# Patient Record
Sex: FEMALE | Race: WHITE | NOT HISPANIC OR LATINO | Employment: OTHER | URBAN - METROPOLITAN AREA
[De-identification: names, ages, dates, MRNs, and addresses within clinical notes are randomized per-mention and may not be internally consistent; named-entity substitution may affect disease eponyms.]

---

## 2021-09-25 ENCOUNTER — APPOINTMENT (OUTPATIENT)
Dept: GENERAL RADIOLOGY | Facility: CLINIC | Age: 66
End: 2021-09-25
Attending: EMERGENCY MEDICINE
Payer: COMMERCIAL

## 2021-09-25 ENCOUNTER — HOSPITAL ENCOUNTER (OUTPATIENT)
Facility: CLINIC | Age: 66
Discharge: HOME | End: 2021-09-26
Attending: EMERGENCY MEDICINE | Admitting: ORTHOPAEDIC SURGERY
Payer: COMMERCIAL

## 2021-09-25 ENCOUNTER — ANESTHESIA EVENT (OUTPATIENT)
Dept: SURGERY | Facility: CLINIC | Age: 66
End: 2021-09-25
Payer: COMMERCIAL

## 2021-09-25 ENCOUNTER — APPOINTMENT (OUTPATIENT)
Dept: GENERAL RADIOLOGY | Facility: CLINIC | Age: 66
End: 2021-09-25
Attending: ORTHOPAEDIC SURGERY
Payer: COMMERCIAL

## 2021-09-25 ENCOUNTER — ANESTHESIA (OUTPATIENT)
Dept: SURGERY | Facility: CLINIC | Age: 66
End: 2021-09-25
Payer: COMMERCIAL

## 2021-09-25 DIAGNOSIS — S53.104A DISLOCATION OF RIGHT ELBOW, INITIAL ENCOUNTER: ICD-10-CM

## 2021-09-25 DIAGNOSIS — S63.004A DISLOCATION OF CARPAL JOINT OF RIGHT WRIST, INITIAL ENCOUNTER: ICD-10-CM

## 2021-09-25 LAB — SARS-COV-2 RNA RESP QL NAA+PROBE: NEGATIVE

## 2021-09-25 PROCEDURE — 258N000003 HC RX IP 258 OP 636: Performed by: NURSE ANESTHETIST, CERTIFIED REGISTERED

## 2021-09-25 PROCEDURE — 710N000012 HC RECOVERY PHASE 2, PER MINUTE: Performed by: ORTHOPAEDIC SURGERY

## 2021-09-25 PROCEDURE — 73080 X-RAY EXAM OF ELBOW: CPT | Mod: RT

## 2021-09-25 PROCEDURE — 250N000011 HC RX IP 250 OP 636: Performed by: NURSE ANESTHETIST, CERTIFIED REGISTERED

## 2021-09-25 PROCEDURE — 96374 THER/PROPH/DIAG INJ IV PUSH: CPT | Mod: 59

## 2021-09-25 PROCEDURE — 258N000003 HC RX IP 258 OP 636: Performed by: ANESTHESIOLOGY

## 2021-09-25 PROCEDURE — 999N000065 XR WRIST PORTABLE RIGHT 2 VIEWS: Mod: TC,RT

## 2021-09-25 PROCEDURE — 250N000009 HC RX 250: Performed by: ANESTHESIOLOGY

## 2021-09-25 PROCEDURE — 99285 EMERGENCY DEPT VISIT HI MDM: CPT | Mod: 25

## 2021-09-25 PROCEDURE — 250N000011 HC RX IP 250 OP 636: Performed by: EMERGENCY MEDICINE

## 2021-09-25 PROCEDURE — 73090 X-RAY EXAM OF FOREARM: CPT | Mod: RT

## 2021-09-25 PROCEDURE — 360N000082 HC SURGERY LEVEL 2 W/ FLUORO, PER MIN: Performed by: ORTHOPAEDIC SURGERY

## 2021-09-25 PROCEDURE — U0003 INFECTIOUS AGENT DETECTION BY NUCLEIC ACID (DNA OR RNA); SEVERE ACUTE RESPIRATORY SYNDROME CORONAVIRUS 2 (SARS-COV-2) (CORONAVIRUS DISEASE [COVID-19]), AMPLIFIED PROBE TECHNIQUE, MAKING USE OF HIGH THROUGHPUT TECHNOLOGIES AS DESCRIBED BY CMS-2020-01-R: HCPCS | Performed by: EMERGENCY MEDICINE

## 2021-09-25 PROCEDURE — 250N000011 HC RX IP 250 OP 636: Performed by: ORTHOPAEDIC SURGERY

## 2021-09-25 PROCEDURE — 250N000011 HC RX IP 250 OP 636: Performed by: ANESTHESIOLOGY

## 2021-09-25 PROCEDURE — 73060 X-RAY EXAM OF HUMERUS: CPT | Mod: RT

## 2021-09-25 PROCEDURE — 272N000001 HC OR GENERAL SUPPLY STERILE: Performed by: ORTHOPAEDIC SURGERY

## 2021-09-25 PROCEDURE — 250N000009 HC RX 250: Performed by: NURSE ANESTHETIST, CERTIFIED REGISTERED

## 2021-09-25 PROCEDURE — 370N000017 HC ANESTHESIA TECHNICAL FEE, PER MIN: Performed by: ORTHOPAEDIC SURGERY

## 2021-09-25 PROCEDURE — C9803 HOPD COVID-19 SPEC COLLECT: HCPCS

## 2021-09-25 PROCEDURE — 271N000001 HC OR GENERAL SUPPLY NON-STERILE: Performed by: ORTHOPAEDIC SURGERY

## 2021-09-25 PROCEDURE — 999N000179 XR SURGERY CARM FLUORO LESS THAN 5 MIN W STILLS: Mod: TC

## 2021-09-25 PROCEDURE — 96376 TX/PRO/DX INJ SAME DRUG ADON: CPT

## 2021-09-25 PROCEDURE — 999N000141 HC STATISTIC PRE-PROCEDURE NURSING ASSESSMENT: Performed by: ORTHOPAEDIC SURGERY

## 2021-09-25 DEVICE — IMP WIRE KIRSCHNER 0.045X4" 78.2020: Type: IMPLANTABLE DEVICE | Site: WRIST | Status: FUNCTIONAL

## 2021-09-25 RX ORDER — ONDANSETRON 4 MG/1
4-8 TABLET, ORALLY DISINTEGRATING ORAL EVERY 8 HOURS PRN
Qty: 10 TABLET | Refills: 0 | Status: SHIPPED | OUTPATIENT
Start: 2021-09-25

## 2021-09-25 RX ORDER — HYDROMORPHONE HYDROCHLORIDE 1 MG/ML
0.5 INJECTION, SOLUTION INTRAMUSCULAR; INTRAVENOUS; SUBCUTANEOUS
Status: COMPLETED | OUTPATIENT
Start: 2021-09-25 | End: 2021-09-25

## 2021-09-25 RX ORDER — DEXAMETHASONE SODIUM PHOSPHATE 4 MG/ML
INJECTION, SOLUTION INTRA-ARTICULAR; INTRALESIONAL; INTRAMUSCULAR; INTRAVENOUS; SOFT TISSUE PRN
Status: DISCONTINUED | OUTPATIENT
Start: 2021-09-25 | End: 2021-09-25

## 2021-09-25 RX ORDER — HYDROXYZINE HYDROCHLORIDE 10 MG/1
10 TABLET, FILM COATED ORAL EVERY 6 HOURS PRN
Qty: 30 TABLET | Refills: 0 | Status: SHIPPED | OUTPATIENT
Start: 2021-09-25

## 2021-09-25 RX ORDER — SODIUM CHLORIDE, SODIUM LACTATE, POTASSIUM CHLORIDE, CALCIUM CHLORIDE 600; 310; 30; 20 MG/100ML; MG/100ML; MG/100ML; MG/100ML
INJECTION, SOLUTION INTRAVENOUS CONTINUOUS
Status: DISCONTINUED | OUTPATIENT
Start: 2021-09-25 | End: 2021-09-25 | Stop reason: HOSPADM

## 2021-09-25 RX ORDER — FENTANYL CITRATE 50 UG/ML
INJECTION, SOLUTION INTRAMUSCULAR; INTRAVENOUS PRN
Status: DISCONTINUED | OUTPATIENT
Start: 2021-09-25 | End: 2021-09-25

## 2021-09-25 RX ORDER — NALOXONE HYDROCHLORIDE 0.4 MG/ML
0.4 INJECTION, SOLUTION INTRAMUSCULAR; INTRAVENOUS; SUBCUTANEOUS
Status: DISCONTINUED | OUTPATIENT
Start: 2021-09-25 | End: 2021-09-26 | Stop reason: HOSPADM

## 2021-09-25 RX ORDER — SODIUM CHLORIDE, SODIUM LACTATE, POTASSIUM CHLORIDE, CALCIUM CHLORIDE 600; 310; 30; 20 MG/100ML; MG/100ML; MG/100ML; MG/100ML
INJECTION, SOLUTION INTRAVENOUS CONTINUOUS PRN
Status: DISCONTINUED | OUTPATIENT
Start: 2021-09-25 | End: 2021-09-25

## 2021-09-25 RX ORDER — CLINDAMYCIN PHOSPHATE 900 MG/50ML
900 INJECTION, SOLUTION INTRAVENOUS
Status: DISCONTINUED | OUTPATIENT
Start: 2021-09-25 | End: 2021-09-25 | Stop reason: HOSPADM

## 2021-09-25 RX ORDER — LIDOCAINE 40 MG/G
CREAM TOPICAL
Status: DISCONTINUED | OUTPATIENT
Start: 2021-09-25 | End: 2021-09-25 | Stop reason: HOSPADM

## 2021-09-25 RX ORDER — HYDROCODONE BITARTRATE AND ACETAMINOPHEN 5; 325 MG/1; MG/1
1-2 TABLET ORAL EVERY 6 HOURS PRN
Status: DISCONTINUED | OUTPATIENT
Start: 2021-09-25 | End: 2021-09-26 | Stop reason: HOSPADM

## 2021-09-25 RX ORDER — HYDROMORPHONE HCL IN WATER/PF 6 MG/30 ML
0.2 PATIENT CONTROLLED ANALGESIA SYRINGE INTRAVENOUS EVERY 5 MIN PRN
Status: DISCONTINUED | OUTPATIENT
Start: 2021-09-25 | End: 2021-09-25 | Stop reason: HOSPADM

## 2021-09-25 RX ORDER — BUPIVACAINE HYDROCHLORIDE 2.5 MG/ML
INJECTION, SOLUTION INFILTRATION; PERINEURAL PRN
Status: DISCONTINUED | OUTPATIENT
Start: 2021-09-25 | End: 2021-09-25 | Stop reason: HOSPADM

## 2021-09-25 RX ORDER — ONDANSETRON 2 MG/ML
INJECTION INTRAMUSCULAR; INTRAVENOUS PRN
Status: DISCONTINUED | OUTPATIENT
Start: 2021-09-25 | End: 2021-09-25

## 2021-09-25 RX ORDER — PROPOFOL 10 MG/ML
1 INJECTION, EMULSION INTRAVENOUS ONCE
Status: COMPLETED | OUTPATIENT
Start: 2021-09-25 | End: 2021-09-25

## 2021-09-25 RX ORDER — METOPROLOL TARTRATE 25 MG/1
50 TABLET, FILM COATED ORAL ONCE
Status: DISCONTINUED | OUTPATIENT
Start: 2021-09-25 | End: 2021-09-25

## 2021-09-25 RX ORDER — NALOXONE HYDROCHLORIDE 0.4 MG/ML
0.2 INJECTION, SOLUTION INTRAMUSCULAR; INTRAVENOUS; SUBCUTANEOUS
Status: DISCONTINUED | OUTPATIENT
Start: 2021-09-25 | End: 2021-09-26 | Stop reason: HOSPADM

## 2021-09-25 RX ORDER — LABETALOL HYDROCHLORIDE 5 MG/ML
10 INJECTION, SOLUTION INTRAVENOUS
Status: COMPLETED | OUTPATIENT
Start: 2021-09-25 | End: 2021-09-25

## 2021-09-25 RX ORDER — ONDANSETRON 2 MG/ML
4 INJECTION INTRAMUSCULAR; INTRAVENOUS EVERY 30 MIN PRN
Status: DISCONTINUED | OUTPATIENT
Start: 2021-09-25 | End: 2021-09-25 | Stop reason: HOSPADM

## 2021-09-25 RX ORDER — ACETAMINOPHEN 325 MG/1
650 TABLET ORAL EVERY 4 HOURS PRN
Qty: 100 TABLET | Refills: 0 | Status: SHIPPED | OUTPATIENT
Start: 2021-09-25

## 2021-09-25 RX ORDER — FENTANYL CITRATE 50 UG/ML
50 INJECTION, SOLUTION INTRAMUSCULAR; INTRAVENOUS EVERY 5 MIN PRN
Status: DISCONTINUED | OUTPATIENT
Start: 2021-09-25 | End: 2021-09-25 | Stop reason: HOSPADM

## 2021-09-25 RX ORDER — KETAMINE HYDROCHLORIDE 10 MG/ML
INJECTION INTRAMUSCULAR; INTRAVENOUS PRN
Status: DISCONTINUED | OUTPATIENT
Start: 2021-09-25 | End: 2021-09-25

## 2021-09-25 RX ORDER — PROPOFOL 10 MG/ML
INJECTION, EMULSION INTRAVENOUS
Status: DISCONTINUED
Start: 2021-09-25 | End: 2021-09-25 | Stop reason: HOSPADM

## 2021-09-25 RX ORDER — PROPOFOL 10 MG/ML
INJECTION, EMULSION INTRAVENOUS CONTINUOUS PRN
Status: DISCONTINUED | OUTPATIENT
Start: 2021-09-25 | End: 2021-09-25

## 2021-09-25 RX ORDER — FLUMAZENIL 0.1 MG/ML
0.2 INJECTION, SOLUTION INTRAVENOUS
Status: ACTIVE | OUTPATIENT
Start: 2021-09-25 | End: 2021-09-26

## 2021-09-25 RX ORDER — HYDROCODONE BITARTRATE AND ACETAMINOPHEN 5; 325 MG/1; MG/1
1 TABLET ORAL EVERY 6 HOURS PRN
Qty: 25 TABLET | Refills: 0 | Status: SHIPPED | OUTPATIENT
Start: 2021-09-25 | End: 2021-09-30

## 2021-09-25 RX ORDER — ONDANSETRON 4 MG/1
4 TABLET, ORALLY DISINTEGRATING ORAL EVERY 30 MIN PRN
Status: DISCONTINUED | OUTPATIENT
Start: 2021-09-25 | End: 2021-09-25

## 2021-09-25 RX ADMIN — HYDROMORPHONE HYDROCHLORIDE 0.5 MG: 1 INJECTION, SOLUTION INTRAMUSCULAR; INTRAVENOUS; SUBCUTANEOUS at 17:03

## 2021-09-25 RX ADMIN — HYDROMORPHONE HYDROCHLORIDE 0.5 MG: 1 INJECTION, SOLUTION INTRAMUSCULAR; INTRAVENOUS; SUBCUTANEOUS at 15:35

## 2021-09-25 RX ADMIN — ONDANSETRON HYDROCHLORIDE 4 MG: 2 INJECTION, SOLUTION INTRAVENOUS at 19:07

## 2021-09-25 RX ADMIN — FENTANYL CITRATE 75 MCG: 50 INJECTION, SOLUTION INTRAMUSCULAR; INTRAVENOUS at 18:16

## 2021-09-25 RX ADMIN — MIDAZOLAM 2 MG: 1 INJECTION INTRAMUSCULAR; INTRAVENOUS at 18:12

## 2021-09-25 RX ADMIN — SODIUM CHLORIDE, POTASSIUM CHLORIDE, SODIUM LACTATE AND CALCIUM CHLORIDE: 600; 310; 30; 20 INJECTION, SOLUTION INTRAVENOUS at 20:17

## 2021-09-25 RX ADMIN — LIDOCAINE HYDROCHLORIDE 30 ML: 10 INJECTION, SOLUTION EPIDURAL; INFILTRATION; INTRACAUDAL; PERINEURAL at 19:02

## 2021-09-25 RX ADMIN — DEXAMETHASONE SODIUM PHOSPHATE 4 MG: 4 INJECTION, SOLUTION INTRA-ARTICULAR; INTRALESIONAL; INTRAMUSCULAR; INTRAVENOUS; SOFT TISSUE at 19:07

## 2021-09-25 RX ADMIN — PROPOFOL 200 MG: 10 INJECTION, EMULSION INTRAVENOUS at 18:15

## 2021-09-25 RX ADMIN — SODIUM CHLORIDE, POTASSIUM CHLORIDE, SODIUM LACTATE AND CALCIUM CHLORIDE: 600; 310; 30; 20 INJECTION, SOLUTION INTRAVENOUS at 18:12

## 2021-09-25 RX ADMIN — LABETALOL HYDROCHLORIDE 10 MG: 5 INJECTION, SOLUTION INTRAVENOUS at 21:40

## 2021-09-25 RX ADMIN — Medication 100 MG: at 18:15

## 2021-09-25 RX ADMIN — FENTANYL CITRATE 25 MCG: 50 INJECTION, SOLUTION INTRAMUSCULAR; INTRAVENOUS at 19:06

## 2021-09-25 RX ADMIN — Medication 5 MG: at 19:23

## 2021-09-25 RX ADMIN — PROPOFOL 75 MCG/KG/MIN: 10 INJECTION, EMULSION INTRAVENOUS at 19:05

## 2021-09-25 RX ADMIN — HYDROMORPHONE HYDROCHLORIDE 0.5 MG: 1 INJECTION, SOLUTION INTRAMUSCULAR; INTRAVENOUS; SUBCUTANEOUS at 14:48

## 2021-09-25 RX ADMIN — SODIUM CHLORIDE, POTASSIUM CHLORIDE, SODIUM LACTATE AND CALCIUM CHLORIDE: 600; 310; 30; 20 INJECTION, SOLUTION INTRAVENOUS at 20:35

## 2021-09-25 RX ADMIN — Medication 10 MG: at 19:10

## 2021-09-25 RX ADMIN — LIDOCAINE HYDROCHLORIDE 50 ML: 10 INJECTION, SOLUTION EPIDURAL; INFILTRATION; INTRACAUDAL; PERINEURAL at 18:13

## 2021-09-25 ASSESSMENT — ENCOUNTER SYMPTOMS
HEADACHES: 0
BACK PAIN: 0
DYSRHYTHMIAS: 1
ARTHRALGIAS: 1
SEIZURES: 0

## 2021-09-25 ASSESSMENT — COPD QUESTIONNAIRES: COPD: 0

## 2021-09-25 ASSESSMENT — MIFFLIN-ST. JEOR: SCORE: 1627.22

## 2021-09-25 NOTE — ED PROVIDER NOTES
History   Chief Complaint:  Fall     HPI   Lauren Falk is a 66 year old female with a history of NIDDM, hypertension, hyperlipidemia and osteoarthritis who presents for evaluation of right elbow pain after a mechanical fall. The patient states that she fell off of a bottom floor and landed on her right elbow about 30 minutes prior to arrival. She states that she felt it was bent wrong and is complaining of increasing pain. She rates her pain 6/10 currently. She has not taken anything yet for pain but has been applying ice. She did not hit her head and denies any right shoulder, right wrist, left arm, bilateral leg or back pain. She notes that she has not taken her beta blocker or antihypertensive medications today.     Review of Systems   Musculoskeletal: Positive for arthralgias (right elbow). Negative for back pain and gait problem.   Neurological: Negative for syncope and headaches.   All other systems reviewed and are negative.    Allergies:  Penicillins  Acetaminophen  Codeine  Ibuprofen  Morphine  Niacin   Rice   Strawberry extract   Tape    Medications:  Lopressor   Crestor   Aspirin   Protonix   Metformin  Enalapril    Past Medical History:    Type 2 diabetes mellitus   Hypertension  Hyperlipidemia   Arthritis   Kidney stones  Degenerative joint disease of knee  Right ventricular outflow tract ventricular tachycardia   Grade I diastolic dysfunction  Osteoarthritis   Cholecystitis     Past Surgical History:    Joint replacement   Colonoscopy   Lap cholecystectomy   Fragmenting of kidney stone  Total knee replacement   Hernia repair   Total hip replacement    Family History:    MI - brother   Hyperlipidemia - brother, sister  Cancer - father (non hodgkin lymphoma)   Diabetes - mother   Hypertension - mother   Lupus - mother     Social History:  Presents to the ED: with her spouse, Marco    Physical Exam     Patient Vitals for the past 24 hrs:   BP Temp Temp src Pulse Resp SpO2 Weight   09/25/21 1710  -- -- -- 89 14 99 % --   09/25/21 1705 -- -- -- 62 10 100 % --   09/25/21 1700 (!) 151/78 -- -- 60 13 100 % --   09/25/21 1655 (!) 146/67 -- -- 62 9 94 % --   09/25/21 1650 (!) 143/81 -- -- 63 20 -- --   09/25/21 1645 (!) 146/67 -- -- 61 13 -- --   09/25/21 1640 111/80 -- -- 58 30 -- --   09/25/21 1635 133/81 -- -- 62 11 -- --   09/25/21 1630 (!) 140/69 -- -- 67 9 100 % --   09/25/21 1625 131/66 -- -- 57 13 98 % --   09/25/21 1620 (!) 141/72 -- -- 59 17 98 % --   09/25/21 1615 (!) 143/79 -- -- 56 17 98 % --   09/25/21 1610 139/73 -- -- 60 16 98 % --   09/25/21 1605 (!) 151/103 -- -- 58 19 -- --   09/25/21 1605 (!) 151/103 -- -- 59 (!) 31 100 % --   09/25/21 1600 (!) 148/70 -- -- 62 16 100 % 113.4 kg (250 lb)   09/25/21 1429 (!) 173/85 -- -- -- -- -- --   09/25/21 1427 -- 97  F (36.1  C) Temporal 83 18 98 % --       Physical Exam  VS: Reviewed per above  HENT: normal speech  EYES: sclera anicteric  CV: Rate as noted, regular rhythm.   RESP: Effort normal.  NEURO: Alert, moving all extremities.  Sensation intact to light touch in the distal right upper extremity.  Limited wrist extension of the right upper extremity, possibly due to pain.  Intact right finger abduction and thumb/pinky opposition.  MSK: Deformity about the right elbow.  Intact right radial pulse.  Limited passive range of motion of the right elbow due to pain.  SKIN: Warm and dry    Emergency Department Course     Imaging:  Humerus XR, G/E 2 views, right  1. Elbow dislocation. Fracture fragment posterior to the distal humerus may be from the coracoid process.   2. Lunate dislocation at the wrist. The remainder of the forearm appears intact.   3. Remainder of the humerus appears intact.    Elbow XR, G/E 3 views, right  1. Elbow dislocation. Fracture fragment posterior to the distal humerus may be from the coracoid process.   2. Lunate dislocation at the wrist. The remainder of the forearm appears intact.   3. Remainder of the humerus appears  "intact.    Radius/Ulna XR, PA & LAT  1. Elbow dislocation. Fracture fragment posterior to the distal humerus may be from the coracoid process.   2. Lunate dislocation at the wrist. The remainder of the forearm appears intact.   3. Remainder of the humerus appears intact.    Reading per radiology.    Laboratory:  Asymptomatic COVID-19 PCR: Negative    Emergency Department Course:    Reviewed:  I reviewed the patient's nursing notes, vitals and past medical history.     Assessments:  1430 I performed an exam of the patient in room ED15 as documented above.  1529 Patient updated. Patient was transferred from ED15 to ED03 for sedation and elbow reduction. RT paged.   1610 I updated the patient on results and discussed plan of care. No longer planning to reduce the patient's elbow in the ED, instead will go to the OR due to incidental finding of dislocation of wrist.    Consults:    1605 I consulted with Dr. Nava, on-call orthopedist from [facility].   1615 I consulted with Dr. Fry, from ortho-hand.   1630 Dr. Fry called back stating that he can take the patient to the OR.     Interventions:  1448 Dilaudid, 0.5 mg, IV    1535 Dilaudid, 0.5 mg, IV     Disposition:  The patient was transferred to the OR under the care of Dr. Fry    Impression & Plan     Medical Decision Making:  Lauren Falk is a 66 year old female who presents to the emergency department today for evaluation of elbow pain and deformity after slip and fall from standing.  On arrival vital signs are reassuring.  On exam there is evidence of good perfusion of the right upper extremity distally.  There is question of some limited motor strength with wrist extension of the right upper extremity, possibly due to pain.  X-ray imaging shows right elbow dislocation as well as reported \"lunate\" dislocation at the wrist.  I spoke with orthopedic hand surgery after actually speaking with general orthopedic surgery.  As patient would require operative intervention " for the perilunate dislocation, plan for both elbow reduction and lunate reduction in the operating room.  Patient remained stable in the ER prior to transfer to the PACU.    Covid-19  Lauren Falk was evaluated during a global COVID-19 pandemic, which necessitated consideration that the patient might be at risk for infection with the SARS-CoV-2 virus that causes COVID-19.   Applicable protocols for evaluation were followed during the patient's care.   COVID-19 was considered as part of the patient's evaluation. The plan for testing is:  a test was obtained during this visit.    Diagnosis:    ICD-10-CM    1. Dislocation of carpal joint of right wrist, initial encounter  S63.004A    2. Dislocation of right elbow, initial encounter  S53.104A        Scribe Disclosure:  I, Nikki Quintana, am serving as a scribe at 2:34 PM on 9/25/2021 to document services personally performed by Long Bill MD based on my observations and the provider's statements to me.    This note was completed in part using Dragon voice recognition software. Although reviewed after completion, some word and grammatical errors may occur.      Long Bill MD  09/25/21 2005

## 2021-09-25 NOTE — CONSULTS
65 y/o female with a right upper extremity injury after a fall. She reported right elbow pain and x- rays demonstrate a posterolateral elbow dislocation and a perilunate dislocation. Orthopedics was consulted    PMH  NIDDM  Hypertension  Hyperlipidemia  Osteoarthritis s/p recent TKA    Medications  Lopressor  Crestor  Aspirin  Protonix  Metformin    Allergies  PCN  Acetaminophen  Codeine  Ibuprofen  Morphine  Niacin    Exam  Comfortable with arm at rest  More evident elbow than wrist deformity  Skin intact  Hand well perfused  Intact LT sensation radial median and ulnar nerves  Limited finger ROM    Xrays  Perilunate dislocation, no obvious carpal fractures  Elbow dislocation with apparent coracoid fracture    Impression  Elbow and perilunate dislocations right upper extremity      Plan  Proceed to OR for reduction of elbow dislocatin  Open carpal tunnel release and open or closed reduction of perilunate injury with   Pinning, possible open ligament repair    She lives near [facility].  She will return there for follow up care.  Need for elbow CT and potential need for additional elbow and/or wrist surgery discussed briefly preoperatively.  Stiffness or instability/arthritis reviewed.

## 2021-09-25 NOTE — ANESTHESIA PREPROCEDURE EVALUATION
Anesthesia Pre-Procedure Evaluation    Patient: Lauren Falk   MRN: [id number] : 1955        Preoperative Diagnosis: * No pre-op diagnosis entered *   Procedure : Procedure(s):  CLOSED REDUCTION, FRACTURE, WRIST AND ELBOW WITH PINNING     No past medical history on file.   No past surgical history on file.   Allergies   Allergen Reactions     Ibuprofen Nausea and Vomiting     Acetaminophen Nausea and Vomiting     Morphine Other (See Comments)     isomnia     Penicillins Hives     Rice      Other reaction(s): GI Upset  STOMACH ACHE     [place] Itching     ITCH     Adhesive Tape Rash     SKIN REDNESS     Codeine Other (See Comments)     Other reaction(s): Headache  HEADACHE       Niacin Other (See Comments)     NAUSEA, MUSCLE ACHES  NAUSEA, MUSCLE ACHES        Social History     Tobacco Use     Smoking status: Not on file   Substance Use Topics     Alcohol use: Not on file      Wt Readings from Last 1 Encounters:   21 113.4 kg (250 lb)        Anesthesia Evaluation   Pt has had prior anesthetic. Type: General and Regional.    No history of anesthetic complications       ROS/MED HX  ENT/Pulmonary:     (+) JESSICA risk factors, snores loudly, hypertension, obese,  (-) asthma, COPD and recent URI   Neurologic:    (-) no seizures, no CVA and migraines   Cardiovascular:     (+) Dyslipidemia hypertension-----dysrhythmias ( RVOT VT, paroxysmal VT (on beta blocker)), Previous cardiac testing   Echo: Date: 10/2020 Results:  The patient was in normal sinus rhythm during the study.   There is mild concentric left ventricular hypertrophy.   Left ventricular systolic function is normal.   Ejection Fraction = 60-65%.   The right ventricle is normal in size and function.   Compared to prior study obtained on10/23/18 no significant change.     Stress Test: Date: 10/2018 Results:  Normal myocardial perfusion study.  This study suggests no perfusion evidence of previous infarct or active myocardial  ischemia.  Patient  was not gated due to heart rhythm and so therefore an ejection fraction was not  calculated.  Of note, the patient was noted to have nonsustained ventricular tachycardia, consistent  with likely right ventricular outflow tract tachycardia.  Cardiology consultation arranged.    ECG Reviewed: Date: Results:    Cath: Date: Results:   (-) stent   METS/Exercise Tolerance:     Hematologic:    (-) history of blood clots and anemia   Musculoskeletal:   (+) arthritis ( s/p TKA 6/2021),     GI/Hepatic:    (-) GERD   Renal/Genitourinary:       Endo:     (+) type II DM, Obesity ( BMI 43 ),     Psychiatric/Substance Use:    (-) chronic opioid use history   Infectious Disease:       Malignancy:       Other:          Holter 9/2020:  Patient had a min HR of 43 bpm, max HR of 218 bpm, and avg HR of 64 bpm.   Predominant underlying rhythm was Sinus Rhythm. 870 Ventricular   Tachycardia runs occurred, the run with the fastest interval lasting 6   beats with a max rate of 218 bpm, the longest lasting 13 beats with an avg   rate of 175 bpm. 1 run of Supraventricular Tachycardia occurred lasting 5   beats with a max rate of 118 bpm (avg 108 bpm).    Physical Exam    Airway        Mallampati: III   TM distance: > 3 FB   Neck ROM: full   Mouth opening: > 3 cm    Respiratory Devices and Support         Dental         B=Bridge, C=Chipped, L=Loose, M=Missing    Cardiovascular   cardiovascular exam normal          Pulmonary   pulmonary exam normal                OUTSIDE LABS:  CBC: No results found for: WBC, HGB, HCT, PLT  BMP: No results found for: NA, POTASSIUM, CHLORIDE, CO2, BUN, CR, GLC  COAGS: No results found for: PTT, INR, FIBR  POC: No results found for: BGM, HCG, HCGS  HEPATIC: No results found for: ALBUMIN, PROTTOTAL, ALT, AST, GGT, ALKPHOS, BILITOTAL, BILIDIRECT, RENETTA  OTHER: No results found for: PH, LACT, A1C, REYES, PHOS, MAG, LIPASE, AMYLASE, TSH, T4, T3, CRP, SED    Anesthesia Plan    ASA Status:  3, emergent    NPO Status:   Will be NPO Appropriate at ... (Per surgeon, case is emergent given joint dislocations and pending nerve impingement) 9/25/2021 7:00 PM   Anesthesia Type: General.     - Airway: ETT   Induction: RSI.   Maintenance: Inhalation.   Techniques and Equipment:     - Airway: Video-Laryngoscope         Consents    Anesthesia Plan(s) and associated risks, benefits, and realistic alternatives discussed. Questions answered and patient/representative(s) expressed understanding.     - Discussed with:  Patient      - Extended Intubation/Ventilatory Support Discussed: Yes.      - Patient is DNR/DNI Status: No         Postoperative Care    Pain management: IV analgesics, Oral pain medications, Multi-modal analgesia.   PONV prophylaxis: Ondansetron (or other 5HT-3), Dexamethasone or Solumedrol     Comments:                Lizbeth Parkinson MD

## 2021-09-25 NOTE — ED TRIAGE NOTES
"Patient fell off bottom stair. Landed on right elbow, now c/o pain.   Patient states arm was floppy and \"bent wrong\". Patient is able to move fingers but unable to more arm. Patient in triage with ice to elbow. Denies hitting head or pain anywhere else. ABCs intact.   "

## 2021-09-26 VITALS
WEIGHT: 245.2 LBS | HEIGHT: 64 IN | TEMPERATURE: 98.8 F | OXYGEN SATURATION: 94 % | BODY MASS INDEX: 41.86 KG/M2 | RESPIRATION RATE: 18 BRPM | SYSTOLIC BLOOD PRESSURE: 146 MMHG | DIASTOLIC BLOOD PRESSURE: 80 MMHG | HEART RATE: 70 BPM

## 2021-09-26 PROBLEM — S63.004A: Status: ACTIVE | Noted: 2021-09-26

## 2021-09-26 PROBLEM — S53.104A DISLOCATION OF RIGHT ELBOW, INITIAL ENCOUNTER: Status: ACTIVE | Noted: 2021-09-26

## 2021-09-26 LAB — GLUCOSE BLDC GLUCOMTR-MCNC: 155 MG/DL (ref 70–99)

## 2021-09-26 PROCEDURE — 99203 OFFICE O/P NEW LOW 30 MIN: CPT | Performed by: INTERNAL MEDICINE

## 2021-09-26 PROCEDURE — 99207 PR CDG-CODE CATEGORY CHANGED: CPT | Performed by: INTERNAL MEDICINE

## 2021-09-26 PROCEDURE — 250N000013 HC RX MED GY IP 250 OP 250 PS 637: Performed by: INTERNAL MEDICINE

## 2021-09-26 PROCEDURE — 250N000013 HC RX MED GY IP 250 OP 250 PS 637: Performed by: ORTHOPAEDIC SURGERY

## 2021-09-26 RX ORDER — HYDROXYZINE HYDROCHLORIDE 25 MG/1
25 TABLET, FILM COATED ORAL EVERY 6 HOURS PRN
Status: DISCONTINUED | OUTPATIENT
Start: 2021-09-26 | End: 2021-09-26 | Stop reason: HOSPADM

## 2021-09-26 RX ORDER — ENALAPRIL MALEATE 5 MG/1
5 TABLET ORAL DAILY
COMMUNITY

## 2021-09-26 RX ORDER — ENALAPRIL MALEATE 2.5 MG/1
5 TABLET ORAL DAILY
Status: DISCONTINUED | OUTPATIENT
Start: 2021-09-26 | End: 2021-09-26 | Stop reason: HOSPADM

## 2021-09-26 RX ORDER — POTASSIUM CITRATE 10 MEQ/1
10 TABLET, EXTENDED RELEASE ORAL 2 TIMES DAILY
COMMUNITY

## 2021-09-26 RX ORDER — METOPROLOL TARTRATE 50 MG
50 TABLET ORAL 2 TIMES DAILY
Status: DISCONTINUED | OUTPATIENT
Start: 2021-09-26 | End: 2021-09-26 | Stop reason: HOSPADM

## 2021-09-26 RX ORDER — METOPROLOL TARTRATE 50 MG
50 TABLET ORAL 2 TIMES DAILY
COMMUNITY

## 2021-09-26 RX ORDER — ROSUVASTATIN CALCIUM 5 MG/1
5 TABLET, COATED ORAL DAILY
COMMUNITY

## 2021-09-26 RX ORDER — OXYCODONE HYDROCHLORIDE 5 MG/1
5 TABLET ORAL EVERY 4 HOURS PRN
Status: DISCONTINUED | OUTPATIENT
Start: 2021-09-26 | End: 2021-09-26 | Stop reason: HOSPADM

## 2021-09-26 RX ORDER — LIDOCAINE 40 MG/G
CREAM TOPICAL
Status: DISCONTINUED | OUTPATIENT
Start: 2021-09-26 | End: 2021-09-26 | Stop reason: HOSPADM

## 2021-09-26 RX ADMIN — METFORMIN HYDROCHLORIDE 500 MG: 500 TABLET, FILM COATED ORAL at 10:15

## 2021-09-26 RX ADMIN — ENALAPRIL MALEATE 5 MG: 2.5 TABLET ORAL at 10:15

## 2021-09-26 RX ADMIN — HYDROCODONE BITARTRATE AND ACETAMINOPHEN 1 TABLET: 5; 325 TABLET ORAL at 10:51

## 2021-09-26 RX ADMIN — METOPROLOL TARTRATE 50 MG: 50 TABLET, FILM COATED ORAL at 10:16

## 2021-09-26 NOTE — PROGRESS NOTES
Spoke with ortho provider Eileen Fry over the phone. PA will round on patient for anticipated discharge for today.  Plan is for patient to f/u with ortho once returns to [place].    No further needs at this time.    Celeste Holder RN Case Manager  Inpatient Care Coordination  [facility]   [phone]

## 2021-09-26 NOTE — PHARMACY-ADMISSION MEDICATION HISTORY
Admission medication history interview status for this patient is complete. See [facility] admission navigator for allergy information, prior to admission medications and immunization status.     Medication history interview done, indicate source(s): Patient  Medication history resources (including written lists, pill bottles, clinic record):SureScripts and Care Everywhere  Pharmacy: [facility] in [place], [place]    Changes made to PTA medication list:  Added: All meds  Changed: None  Reported as Not Taking: None  Removed: Nonee    Actions taken by pharmacist (provider contacted, etc):Spoke with pt to verify med list.     Additional medication history information: Pt stated she is supposed to start taking her rosuvastatin after knee surgery, but has not started yet.    Medication reconciliation/reorder completed by provider prior to medication history?  N   (Y/N)       For patients on insulin therapy: N  (Y/N)    Prior to Admission medications    Medication Sig Last Dose Taking? Auth Provider   aspirin (ASA) 325 MG EC tablet Take 325-650 mg by mouth daily as needed for moderate pain Past Month at Unknown time Yes Unknown, Entered By History   diclofenac (VOLTAREN) 1 % topical gel Apply 2 g topically 4 times daily as needed for moderate pain Past Week at Unknown time Yes Unknown, Entered By History   enalapril (VASOTEC) 5 MG tablet Take 5 mg by mouth daily 9/24/2021 Yes Unknown, Entered By History   metFORMIN (GLUCOPHAGE) 500 MG tablet Take 500 mg by mouth 2 times daily (with meals) 9/24/2021 Yes Unknown, Entered By History   metoprolol tartrate (LOPRESSOR) 50 MG tablet Take 50 mg by mouth 2 times daily 9/24/2021 Yes Unknown, Entered By History   potassium citrate (UROCIT-K) 10 MEQ (1080 MG) CR tablet Take 10 mEq by mouth 2 times daily 9/24/2021 Yes Unknown, Entered By History

## 2021-09-26 NOTE — DISCHARGE SUMMARY
"Lauren Falk  2021  POD # 1 s/p    PROCEDURE:  1.  Open reduction and percutaneous pinning, right perilunate dislocation.  2.  Open carpal tunnel release.  3.  Closed reduction elbow dislocation.[facility] Date:  2021      Doing well.  Objective: no chest pain or shortness of breath. Ready to discharge home for [place] today.   Blood pressure (!) 146/80, pulse 70, temperature 98.8  F (37.1  C), temperature source Oral, resp. rate 18, height 1.626 m (5' 4\"), weight 111.2 kg (245 lb 3.2 oz), SpO2 94 %.    Temperatures:  Current - Temp: 98.8  F (37.1  C); Max - Temp  Av.1  F (36.7  C)  Min: 97  F (36.1  C)  Max: 99.2  F (37.3  C)  Pulse range: Pulse  Av.7  Min: 56  Max: 91  Blood pressure range: Systolic (24hrs), Av , Min:111 , Max:173   ; Diastolic (24hrs), Av, Min:66, Max:116    Exam:  CMS: intact  alert, stable, wound ok  Dressing/splint in good position.   Able to flex and extend all digits in the RUE. Able to abduct and adduct the digits.   Able to flex, extend, abduct, and adduct the thumb.   Brisk capillary refill in all digits.   Slightly decreased sensation over digits 3/4 when compared with other digits.     Labs:  No results for input(s): POTASSIUM in the last 28595 hours.  No results for input(s): HGB in the last 52355 hours.  No results for input(s): INR in the last 84078 hours.  No results for input(s): PLT in the last 46452 hours.    PLAN: patient can discharge for home today. All scripts done and discharge info done. She will follow up with Orthopedist in [place] within 7 days. Continue to closely monitor status and recovery of the sensation in the 3/4th digit.     "

## 2021-09-26 NOTE — CONSULTS
[facility]  Hospitalist Consult Note  Name: Lauren Falk    MRN: [id number]  YOB: 1955    Age: 66 year old  Date of admission: 9/25/2021  Primary care provider: No Ref-Primary, Physician     Requesting Physician:  Dr. Fry  Reason for consult:  Post-operative medical management         Assessment and Plan:   Lauren Falk is a 66 year old female with a history of HTN, DM2, HLD who was admitted 9/25/21 after a fall with resultant right elbow and perilunate dislocation which required repair operative repair.      1. Fall with resultant right elbow and perilunate dislocation s/p OR and percutaneous pinning, open carpal tunnel release and closed reduction elbow dislocation on 9/25: Defer management to orthopedics.    2. HTN: PTA on Metoprolol and Enalapril.  Continue.    3. DM2: PTA on Metformin, continue.    4. HLD: Hold PTA statin, can resume upon discharge.    Code status: Full   Prophylaxis: defer to primary team  Disposition: defer to primary team. Ok for discharge per Hospitalist.    Thank you for the consultation, we will continue to follow along during the hospitalization. Please page with any questions or concerns.         History of Present Illness:   Lauren Falk is a 66 year old female with a history of HTN, DM2, HLD who was admitted 9/25/21 after a fall with resultant right elbow and perilunate dislocation which required repair operative repair.   Pre-operative note was fully reviewed and recommendations acknowledged. Op note and anesthesia notes and flow sheets reviewed.     The patient had no complications related to the procedure and has had an unremarkable post-operative course to this point. Currently pain is adequately controlled. No nausea, vomiting, diarrhea or constipation. No fevers, chills, diaphoresis. No chest pain, palpitations, dyspnea. Jin catheter still in place. Tolerating oral intake. No excessive somnolence and patient is fully alert and  oriented. The patient has no other complaints at this time.      She feels ready for discharge.  Does not have any medical questions for me right now.              Past Medical History:   1.  Type 2 diabetes  2.  Hypertension  3.  Hyperlipidemia          Past Surgical History:   Patient underwent surgery this admission:  1.  Open reduction and percutaneous pinning, right perilunate dislocation.  2.  Open carpal tunnel release.  3.  Closed reduction elbow dislocation.            Social History:     Social History     Tobacco Use     Smoking status: Not on file   Substance Use Topics     Alcohol use: Not on file   Denies smoking history.          Family History:   Family history was fully reviewed and non-contributory in this case.          Allergies:     Allergies   Allergen Reactions     Ibuprofen Nausea and Vomiting     Acetaminophen Nausea and Vomiting     Morphine Other (See Comments)     isomnia     Penicillins Hives     Rice      Other reaction(s): GI Upset  STOMACH ACHE     [place] Itching     ITCH     Adhesive Tape Rash     SKIN REDNESS     Codeine Other (See Comments)     Other reaction(s): Headache  HEADACHE       Niacin Other (See Comments)     NAUSEA, MUSCLE ACHES  NAUSEA, MUSCLE ACHES               Medications:     Prior to Admission medications    Medication Sig Last Dose Taking? Auth Provider   acetaminophen (TYLENOL) 325 MG tablet Take 2 tablets (650 mg) by mouth every 4 hours as needed for other (mild pain)  Yes Eileen Fry MD   aspirin (ASA) 325 MG EC tablet Take 325-650 mg by mouth daily as needed for moderate pain Past Month at Unknown time Yes Unknown, Entered By History   diclofenac (VOLTAREN) 1 % topical gel Apply 2 g topically 4 times daily as needed for moderate pain Past Week at Unknown time Yes Unknown, Entered By History   enalapril (VASOTEC) 5 MG tablet Take 5 mg by mouth daily 9/24/2021 Yes Unknown, Entered By History   HYDROcodone-acetaminophen (NORCO) 5-325 MG tablet Take 1  "tablet by mouth every 6 hours as needed for pain  Yes Eileen Fry MD   hydrOXYzine (ATARAX) 10 MG tablet Take 1 tablet (10 mg) by mouth every 6 hours as needed for itching or anxiety (with pain, moderate pain)  Yes Eileen Fry MD   metFORMIN (GLUCOPHAGE) 500 MG tablet Take 500 mg by mouth 2 times daily (with meals) 9/24/2021 Yes Unknown, Entered By History   metoprolol tartrate (LOPRESSOR) 50 MG tablet Take 50 mg by mouth 2 times daily 9/24/2021 Yes Unknown, Entered By History   ondansetron (ZOFRAN-ODT) 4 MG ODT tab Take 1-2 tablets (4-8 mg) by mouth every 8 hours as needed for nausea Dissolve ON the tongue.  Yes Eileen Fry MD   potassium citrate (UROCIT-K) 10 MEQ (1080 MG) CR tablet Take 10 mEq by mouth 2 times daily 9/24/2021 Yes Unknown, Entered By History   rosuvastatin (CRESTOR) 5 MG tablet Take 5 mg by mouth daily Hasn't started yet  Unknown, Entered By History       Current hospital administered medication list (MAR) also reviewed.          Review of Systems:   A comprehensive greater than 10 system review of systems was carried out.  Pertinent positives and negatives are noted above.  Otherwise negative for contributory info.            Physical Exam:   Blood pressure (!) 146/80, pulse 70, temperature 98.8  F (37.1  C), temperature source Oral, resp. rate 18, height 1.626 m (5' 4\"), weight 111.2 kg (245 lb 3.2 oz), SpO2 94 %.  Exam:  General: Alert, awake, no acute distress.  HEENT: Normocephalic and atraumatic, eyes anicteric and without scleral injection, EOMI, face symmetric, MMM.  Cardiac: RRR, normal S1, S2. No m/g/r, no LE edema.  Pulmonary: Normal chest rise, normal work of breathing.  Lungs CTAB without crackles or wheezing.  Abdomen: soft, non-tender, non-distended.  Normoactive bowel sounds, no guarding or rebound tenderness.  Extremities: no deformities.  Warm, well perfused.  Skin: no rashes or lesions.  Warm and Dry.  Neuro: No focal deficits.  Speech clear.  Spontaneously moving all " extremities in bed.  Psych: Alert and oriented x3. Appropriate affect.          Data:   Imaging:  Reviewed.    Labs: Reviewed.     Mary Manjarrez MD  [facility] Hospitalist  September 26, 2021

## 2021-09-26 NOTE — OP NOTE
Procedure Date: 09/25/2021    PREOPERATIVE DIAGNOSES:    1.  Right elbow posterolateral dislocation.  2.  Right closed perilunate dislocation.    POSTOPERATIVE DIAGNOSES:  1.  Right elbow posterolateral dislocation.  2.  Right closed perilunate dislocation.    PROCEDURE:  1.  Open reduction and percutaneous pinning, right perilunate dislocation.  2.  Open carpal tunnel release.  3.  Closed reduction elbow dislocation.    SURGEON:  Eileen Fry MD    ANESTHESIA:  Ultimately local with IV sedation.    INDICATIONS FOR PROCEDURE:  The patient is a 66-year-old female who fell sustaining what she thought was an elbow injury.  She presented to the Emergency Room and x-rays were obtained confirming an elbow dislocation, but she also had a perilunate dislocation.  She was neurovascularly intact.  She was brought to the holding area where surgery was discussed preoperatively.  She is neurovascularly intact and both injuries are closed.  The nature of the injuries was reviewed.  Need for carpal tunnel surgery to protect the median nerve function is recommended.  Pinning to stabilize the bones for ligamentous healing was also reviewed.  The elbow injury will be reduced and assessed for stability.  Additional imaging and surgery may be necessary for this in staged fashion.  She understands the nature of the procedure and is ready to proceed.    DESCRIPTION OF PROCEDURE:  The patient was brought to the operating room.  A general anesthetic was planned.  Initially this was attempted using a GlideScope.  Subsequently nasopharangeal intubation was attempted.  Both were challenging and ultimately a brachial plexus block was attempted to try to avoid the need for a general anesthetic.  Unfortunately, her anatomy made this difficult and to facilitate treatment in this challenging setting, a block with sedation was recommended.  Ultimately this was adequate and she did quite well throughout the procedure.  Preoperatively 900 mg of  clindamycin was given.   The arm was flexed and the elbow was reduced.  The wrist surgery was then done with a tourniquet placed in the proximal forearm and the elbow was maintained in a flexed position for the majority of the wrist surgery.  The arm was sterilely prepped and draped below the tourniquet.  A pause was performed to confirm the site and the procedure planned.  X-rays were also verified.    A single attempt at closed reduction of the wrist was attempted and was not successful.  The expectation was that an open reduction would be needed.  A carpal tunnel incision was made distally in the palm.  Skin and subcutaneous tissues were divided.  Fibers of the transverse carpal ligament were identified and divided longitudinally.  There was hematoma in the carpal tunnel space.  A separate incision was made proximally to facilitate the reduction.  Skin and subcutaneous tissues were divided.  Dissection proceeded ulnar to the palmaris longus tendon and the nerve was protected radially.  The flexor tendons were retracted and the capsule was exposed.  The wound was irrigated and with traction and dorsally directed pressure the lunate was reduced nicely.  X-rays demonstrated satisfactory reduction.  Once completed, the wounds were irrigated.  X-rays were carefully assessed and no obvious fractures of the scaphoid, triquetrum or capitate were identified.  This was primarily a ligamentous injury.  K-wires were then placed across the scapholunate and lunotriquetral interspaces.  Care was taken to align the joints appropriately.  Additional scaphocapitate and triquetral hamate pins were placed.  The pins were then trimmed just deep to the skin.  X-rays demonstrated satisfactory PA and lateral alignment of the carpus and the pins.  With good visualization of the volar capsule several 3-0 Vicryl sutures were placed to close the capsular defect.  The tourniquet was then deflated and hemostasis was achieved with pressure and  bipolar cautery.  The skin was closed with Monocryl and Prolene proximally and Prolene only distally.  The bulky sterile dressing was then applied.    The drapes were removed and the elbow was inspected.  It had remained reduced through the wrist procedure.  There is significant instability and fluoroscopic images demonstrated small bony fragments on the medial and ulnar aspect of the humerus proximally and a small coronoid fracture of the ulna.  The position of the elbow was accepted.  A bulky sterile dressing was then applied incorporating a posterior plaster splint, maintaining the elbow in 90 degrees of flexion.  X-rays following plaster demonstrate similar mild instability findings at the elbow but an overall acceptable provisional reduction.  She was taken to the recovery room in satisfactory condition.      Eileen Fry MD        D: 2021   T: 2021   MT: [facility]    Name:     KENNY MAGALLON  MRN:      [id number]        Account:        [id number]   :      1955           Procedure Date: 2021     Document: [id number]

## 2021-09-26 NOTE — ANESTHESIA POSTPROCEDURE EVALUATION
Patient: Lauren Falk    Procedure(s):  OPEN REDUCTION PINNING OR PERILUNATE DISLOCATION RIGHT WRIST, OPEN CARPAL TUNNEL RELEASE, CLOSED REDUCTION ELBOW DISLOCATION    Diagnosis:* No pre-op diagnosis entered *  Diagnosis Additional Information: No value filed.    Anesthesia Type:  General    Note:  Disposition: Inpatient   Postop Pain Control: Uneventful            Sign Out: Well controlled pain   PONV: No   Neuro/Psych: Uneventful            Sign Out: Acceptable/Baseline neuro status   Airway/Respiratory: Uneventful            Sign Out: Acceptable/Baseline resp. status   CV/Hemodynamics: Uneventful            Sign Out: Acceptable CV status; No obvious hypovolemia; No obvious fluid overload   Other NRE: NONE   DID A NON-ROUTINE EVENT OCCUR? No           Last vitals:  Vitals Value Taken Time   /81 09/25/21 2232   Temp     Pulse 81 09/25/21 2232   Resp 18 09/25/21 2232   SpO2 93 % 09/25/21 2232       Electronically Signed By: Jose Byrd MD  September 25, 2021  11:48 PM

## 2021-09-26 NOTE — PLAN OF CARE
"BP (!) 148/81 (BP Location: Left arm)   Pulse 81   Temp 97.3  F (36.3  C) (Temporal)   Resp 18   Ht 1.626 m (5' 4\")   Wt 111.2 kg (245 lb 3.2 oz)   SpO2 93%   BMI 42.09 kg/m      Pt A&Ox4. VSS on 1.5L O2. Capno monitoring-WNL. Denied pain overnight. Ice packs applied to R elbow. RUE in cast & sling, CMS intact. Voiding adequately. Up A1 to BSC. Plan to discharge today. Will cont POC.     Nikki Oropeza RN      "

## 2021-09-26 NOTE — ANESTHESIA CARE TRANSFER NOTE
Patient: Lauern Falk    Procedure(s):  OPEN REDUCTION PINNING OR PERILUNATE DISLOCATION RIGHT WRIST, OPEN CARPAL TUNNEL RELEASE, CLOSED REDUCTION ELBOW DISLOCATION    Diagnosis: * No pre-op diagnosis entered *  Diagnosis Additional Information: No value filed.    Anesthesia Type:   General     Note:    Oropharynx: oropharynx clear of all foreign objects  Level of Consciousness: awake  Oxygen Supplementation: face mask  Level of Supplemental Oxygen (L/min / FiO2): 6  Independent Airway: airway patency satisfactory and stable  Dentition: dentition unchanged  Vital Signs Stable: post-procedure vital signs reviewed and stable  Report to RN Given: handoff report given  Patient transferred to: PACU    Handoff Report: Identifed the Patient, Identified the Reponsible Provider, Reviewed the pertinent medical history, Discussed the surgical course, Reviewed Intra-OP anesthesia mangement and issues during anesthesia, Set expectations for post-procedure period and Allowed opportunity for questions and acknowledgement of understanding      Vitals:  Vitals Value Taken Time   /108 09/25/21 2035   Temp     Pulse 91 09/25/21 2035   Resp     SpO2 100 % 09/25/21 2037   Vitals shown include unvalidated device data.    Electronically Signed By: FANG Buckner CRNA  September 25, 2021  8:38 PM

## 2021-09-26 NOTE — PLAN OF CARE
Discharge instructions and medications reviewed with patient and spouse. They state they understand all instructions and have no further questions. They will call their home orthopedic doctor for follow up on Monday. Patient discharged to home in care of .

## 2021-09-26 NOTE — ANESTHESIA PROCEDURE NOTES
Airway       Patient location during procedure: OR       Procedure Start/Stop Times: 9/25/2021 7:18 PM  Staff -        CRNA: Bj Curry APRN CRNA       Performed By: anesthesiologist and CRNAIndications and Patient Condition       Indications for airway management: vince-procedural       Induction type:RSI       Mask difficulty assessment: 1 - vent by mask    Final Airway Details       Final airway type: endotracheal airway       Successful airway: ETT - single  Endotracheal Airway Details        ETT size (mm): 7.0       Cuffed: yes       Successful intubation technique: video laryngoscopy       VL Blade Size: Glidescope 3       Grade View of Cords: 4    Post intubation assessment        Ease of procedure: unable       Dentition: Intact and Unchanged    Additional Comments       Three failed intubation attempts. First with Glidescope 3, second with Glidescope 4, third attempt was an awake fiberoptic.

## 2021-09-28 LAB
ATRIAL RATE - MUSE: 58 BPM
DIASTOLIC BLOOD PRESSURE - MUSE: NORMAL MMHG
INTERPRETATION ECG - MUSE: NORMAL
P AXIS - MUSE: 40 DEGREES
PR INTERVAL - MUSE: 216 MS
QRS DURATION - MUSE: 88 MS
QT - MUSE: 460 MS
QTC - MUSE: 451 MS
R AXIS - MUSE: 8 DEGREES
SYSTOLIC BLOOD PRESSURE - MUSE: NORMAL MMHG
T AXIS - MUSE: 36 DEGREES
VENTRICULAR RATE- MUSE: 58 BPM

## (undated) DEVICE — PREP POVIDONE IODINE SOLUTION 10% 4OZ BOTTLE 29906-004

## (undated) DEVICE — PREP SKIN SCRUB TRAY 4461A

## (undated) DEVICE — SU MONOCRYL 4-0 PS-2 18" UND Y496G

## (undated) DEVICE — GLOVE PROTEXIS MICRO 7.0  2D73PM70

## (undated) DEVICE — LINEN HALF SHEET 5512

## (undated) DEVICE — LINEN ORTHO ACL PACK 5447

## (undated) DEVICE — CAST PLASTER SPLINT 4X15" EXTRA FAST

## (undated) DEVICE — CAST PADDING 4" STERILE 9044S

## (undated) DEVICE — CAST BUCKET

## (undated) DEVICE — DECANTER VIAL 2006S

## (undated) DEVICE — DRAPE C-ARM MINI 5423

## (undated) DEVICE — CAST PADDING 4" UNSTERILE 9044

## (undated) DEVICE — LINEN FULL SHEET 5511

## (undated) DEVICE — SU PROLENE 4-0 PC-3 18" 8634G

## (undated) DEVICE — PIN GUARD 0.045 WHITE C-045

## (undated) DEVICE — CAST STOCKINETTE 4" BIAS CUT

## (undated) DEVICE — BNDG ELASTIC 4"X5YDS UNSTERILE 6611-40

## (undated) DEVICE — BAG CLEAR TRASH 1.3M 39X33" P4040C

## (undated) DEVICE — PREP POVIDONE-IODINE 7.5% SCRUB 4OZ BOTTLE MDS093945

## (undated) DEVICE — PACK HAND SOP32HARMO

## (undated) RX ORDER — LABETALOL HYDROCHLORIDE 5 MG/ML
INJECTION, SOLUTION INTRAVENOUS
Status: DISPENSED
Start: 2021-09-25

## (undated) RX ORDER — PROPOFOL 10 MG/ML
INJECTION, EMULSION INTRAVENOUS
Status: DISPENSED
Start: 2021-09-25

## (undated) RX ORDER — OXYMETAZOLINE HYDROCHLORIDE 0.05 G/100ML
SPRAY NASAL
Status: DISPENSED
Start: 2021-09-25

## (undated) RX ORDER — ONDANSETRON 2 MG/ML
INJECTION INTRAMUSCULAR; INTRAVENOUS
Status: DISPENSED
Start: 2021-09-25

## (undated) RX ORDER — FENTANYL CITRATE 50 UG/ML
INJECTION, SOLUTION INTRAMUSCULAR; INTRAVENOUS
Status: DISPENSED
Start: 2021-09-25

## (undated) RX ORDER — DEXAMETHASONE SODIUM PHOSPHATE 4 MG/ML
INJECTION, SOLUTION INTRA-ARTICULAR; INTRALESIONAL; INTRAMUSCULAR; INTRAVENOUS; SOFT TISSUE
Status: DISPENSED
Start: 2021-09-25

## (undated) RX ORDER — LIDOCAINE HYDROCHLORIDE 10 MG/ML
INJECTION, SOLUTION EPIDURAL; INFILTRATION; INTRACAUDAL; PERINEURAL
Status: DISPENSED
Start: 2021-09-25

## (undated) RX ORDER — BUPIVACAINE HYDROCHLORIDE 5 MG/ML
INJECTION, SOLUTION EPIDURAL; INTRACAUDAL
Status: DISPENSED
Start: 2021-09-25

## (undated) RX ORDER — CLINDAMYCIN PHOSPHATE 900 MG/50ML
INJECTION, SOLUTION INTRAVENOUS
Status: DISPENSED
Start: 2021-09-25

## (undated) RX ORDER — GLYCOPYRROLATE 0.2 MG/ML
INJECTION INTRAMUSCULAR; INTRAVENOUS
Status: DISPENSED
Start: 2021-09-25